# Patient Record
Sex: FEMALE | Race: WHITE | NOT HISPANIC OR LATINO
[De-identification: names, ages, dates, MRNs, and addresses within clinical notes are randomized per-mention and may not be internally consistent; named-entity substitution may affect disease eponyms.]

---

## 2020-08-11 PROBLEM — Z00.00 ENCOUNTER FOR PREVENTIVE HEALTH EXAMINATION: Status: ACTIVE | Noted: 2020-08-11

## 2020-08-12 ENCOUNTER — APPOINTMENT (OUTPATIENT)
Dept: PULMONOLOGY | Facility: CLINIC | Age: 70
End: 2020-08-12
Payer: COMMERCIAL

## 2020-08-12 ENCOUNTER — LABORATORY RESULT (OUTPATIENT)
Age: 70
End: 2020-08-12

## 2020-08-12 VITALS
SYSTOLIC BLOOD PRESSURE: 154 MMHG | TEMPERATURE: 96.9 F | BODY MASS INDEX: 44.48 KG/M2 | OXYGEN SATURATION: 94 % | HEART RATE: 90 BPM | WEIGHT: 267 LBS | DIASTOLIC BLOOD PRESSURE: 92 MMHG | HEIGHT: 65 IN

## 2020-08-12 DIAGNOSIS — Z87.891 PERSONAL HISTORY OF NICOTINE DEPENDENCE: ICD-10-CM

## 2020-08-12 DIAGNOSIS — R07.81 PLEURODYNIA: ICD-10-CM

## 2020-08-12 DIAGNOSIS — Z78.9 OTHER SPECIFIED HEALTH STATUS: ICD-10-CM

## 2020-08-12 DIAGNOSIS — Z82.49 FAMILY HISTORY OF ISCHEMIC HEART DISEASE AND OTHER DISEASES OF THE CIRCULATORY SYSTEM: ICD-10-CM

## 2020-08-12 LAB — EPWORTH SCORE: 2

## 2020-08-12 PROCEDURE — 99205 OFFICE O/P NEW HI 60 MIN: CPT

## 2020-08-12 NOTE — HISTORY OF PRESENT ILLNESS
[Former] : former [Never] : never [TextBox_11] : 1 [TextBox_4] : I was asked to consult on this patient by cardiology for persistent shortness of breath\par The patient is a 71 yo morbidly obese WW former 52-pack-year smoker (1 PPDx 52 years) quit in 2019 and then 2 months later began complaining of increasing shortness of breath. 6 months after she quit smoking she had a stress echo which showed an inferior wall defect and had a cardiac catheterization and one stent placed in the circumflex artery. Despite the successful stent insertion she still complains of severe dyspnea on exertion just walking a few steps even on flat ground but certainly worse up inclines or when she is to wear a mask. She started cardiac rehabilitation 3 weeks ago and hasn't noticed an improvement. She has gained over 70 pounds in the past year. She has a rare cough but denies any phlegm although she has occasional chest rattle that clears after a few deep breaths/ she denies any chest pain or tightness but does have a right anterior parasternal pressure that can occur spontaneously and resolves quickly. She has severe seasonal and perennial allergies but no recent allergy tests. She has severe nasal congestion but denies any postnasal drip or reflux. She does snore loudly and is very tired and has to take naps in the afternoon. She has nocturia twice/night. [TextBox_13] : 52 [YearQuit] : 2019

## 2020-08-12 NOTE — PROCEDURE
[FreeTextEntry1] : 1/17/2020 WBC 5.3 hemoglobin 12.1 platelets 235 eos 3% glucose 114 creatinine 1.2\par Echo 2/4/2020 moderate LAE normal LV function heavy mitral annular calcification\par Exercise perfusion 2/5/2020 moderate size moderate intensity inferior defect normal LV size and function \par 2/12/2020 cardiac catheter one-vessel CAD left Cx\par

## 2020-08-12 NOTE — CONSULT LETTER
[FreeTextEntry1] : Thank you for allowing me to consult on RENETTA SOTO  for SOB/COPD .  Please see my note below.\par \par \par \par   [___] : [unfilled] [FreeTextEntry3] : Thank you very much for allowing me to consult on your patient.  If you have any questions, please do not hesitate to contact me.\par \par Sincerely,\par \par Kevin Parekh MD\par Pulmonary and Sleep Medicine\par [facility]

## 2020-08-12 NOTE — REASON FOR VISIT
[Initial] : an initial visit [Shortness of Breath] : shortness of breath [TextBox_44] : since nov 2019

## 2020-08-12 NOTE — PHYSICAL EXAM
[Low Lying Soft Palate] : low lying soft palate [Erythema] : erythema [III] : Mallampati Class: III [Normal Appearance] : normal appearance [Normal Rate/Rhythm] : normal rate/rhythm [No Neck Mass] : no neck mass [Normal S1, S2] : normal s1, s2 [Murmur ___ / 6] : murmur [unfilled] / 6 [No Murmurs] : no murmurs [No Resp Distress] : no resp distress [Clear to Auscultation Bilaterally] : clear to auscultation bilaterally [Benign] : benign [No Abnormalities] : no abnormalities [Gait - Sufficient For Exercise Testing] : gait sufficient for exercise testing [Normal Gait] : normal gait [No Clubbing] : no clubbing [No Cyanosis] : no cyanosis [No Edema] : no edema [FROM] : FROM [Normal Color/ Pigmentation] : normal color/ pigmentation [Oriented x3] : oriented x3 [No Focal Deficits] : no focal deficits [TextBox_2] : Obese, tachypneic  [Normal Affect] : normal affect [TextBox_54] : RHONDA [TextBox_89] : Obese [TextBox_68] : Diffuse rhonchi and mild-mod wheeze

## 2020-08-12 NOTE — REVIEW OF SYSTEMS
[Dry Mouth] : dry mouth [Nasal Congestion] : nasal congestion [Dyspnea] : dyspnea [A.M. Dry Mouth] : a.m. dry mouth [Wheezing] : wheezing [SOB on Exertion] : sob on exertion [Orthopnea] : orthopnea [Seasonal Allergies] : seasonal allergies [Obesity] : obesity [Arthralgias] : arthralgias [Negative] : Psychiatric

## 2020-08-24 LAB
ALBUMIN SERPL ELPH-MCNC: 4.3 G/DL
ALP BLD-CCNC: 78 U/L
ALT SERPL-CCNC: 22 U/L
ANION GAP SERPL CALC-SCNC: 14 MMOL/L
AST SERPL-CCNC: 25 U/L
BASOPHILS # BLD AUTO: 0.04 K/UL
BASOPHILS NFR BLD AUTO: 0.7 %
BILIRUB SERPL-MCNC: 0.4 MG/DL
BUN SERPL-MCNC: 17 MG/DL
CALCIUM SERPL-MCNC: 9.7 MG/DL
CHLORIDE SERPL-SCNC: 104 MMOL/L
CO2 SERPL-SCNC: 23 MMOL/L
CREAT SERPL-MCNC: 1.22 MG/DL
EOSINOPHIL # BLD AUTO: 0.17 K/UL
EOSINOPHIL NFR BLD AUTO: 3.2 %
GLUCOSE SERPL-MCNC: 142 MG/DL
HCT VFR BLD CALC: 38.7 %
HGB BLD-MCNC: 11.3 G/DL
IMM GRANULOCYTES NFR BLD AUTO: 0.4 %
LYMPHOCYTES # BLD AUTO: 1.18 K/UL
LYMPHOCYTES NFR BLD AUTO: 21.9 %
MAN DIFF?: NORMAL
MCHC RBC-ENTMCNC: 27.9 PG
MCHC RBC-ENTMCNC: 29.2 GM/DL
MCV RBC AUTO: 95.6 FL
MONOCYTES # BLD AUTO: 0.43 K/UL
MONOCYTES NFR BLD AUTO: 8 %
NEUTROPHILS # BLD AUTO: 3.54 K/UL
NEUTROPHILS NFR BLD AUTO: 65.8 %
PLATELET # BLD AUTO: 256 K/UL
POTASSIUM SERPL-SCNC: 5 MMOL/L
PROT SERPL-MCNC: 7.1 G/DL
RBC # BLD: 4.05 M/UL
RBC # FLD: 16.4 %
SODIUM SERPL-SCNC: 140 MMOL/L
WBC # FLD AUTO: 5.38 K/UL

## 2020-08-25 LAB
A ALTERNATA IGE QN: 2.2 KUA/L
A FUMIGATUS IGE QN: 0.18 KUA/L
BERMUDA GRASS IGE QN: <0.1 KUA/L
BOXELDER IGE QN: <0.1 KUA/L
C HERBARUM IGE QN: 0.11 KUA/L
CALIF WALNUT IGE QN: <0.1 KUA/L
CAT DANDER IGE QN: 1.83 KUA/L
CMN PIGWEED IGE QN: <0.1 KUA/L
COMMON RAGWEED IGE QN: <0.1 KUA/L
COTTONWOOD IGE QN: <0.1 KUA/L
D FARINAE IGE QN: <0.1 KUA/L
D PTERONYSS IGE QN: <0.1 KUA/L
DEPRECATED A ALTERNATA IGE RAST QL: 2
DEPRECATED A FUMIGATUS IGE RAST QL: NORMAL
DEPRECATED BERMUDA GRASS IGE RAST QL: 0
DEPRECATED BOXELDER IGE RAST QL: 0
DEPRECATED C HERBARUM IGE RAST QL: NORMAL
DEPRECATED CAT DANDER IGE RAST QL: 2
DEPRECATED COMMON PIGWEED IGE RAST QL: 0
DEPRECATED COMMON RAGWEED IGE RAST QL: 0
DEPRECATED COTTONWOOD IGE RAST QL: 0
DEPRECATED D FARINAE IGE RAST QL: 0
DEPRECATED D PTERONYSS IGE RAST QL: 0
DEPRECATED DOG DANDER IGE RAST QL: NORMAL
DEPRECATED GOOSEFOOT IGE RAST QL: 0
DEPRECATED LONDON PLANE IGE RAST QL: 0
DEPRECATED MUGWORT IGE RAST QL: 0
DEPRECATED P NOTATUM IGE RAST QL: NORMAL
DEPRECATED RED CEDAR IGE RAST QL: 0
DEPRECATED ROACH IGE RAST QL: 0
DEPRECATED SHEEP SORREL IGE RAST QL: 0
DEPRECATED SILVER BIRCH IGE RAST QL: 0
DEPRECATED TIMOTHY IGE RAST QL: 0
DEPRECATED WHITE ASH IGE RAST QL: 0
DEPRECATED WHITE OAK IGE RAST QL: 0
DOG DANDER IGE QN: 0.18 KUA/L
GOOSEFOOT IGE QN: <0.1 KUA/L
LONDON PLANE IGE QN: <0.1 KUA/L
MUGWORT IGE QN: <0.1 KUA/L
MULBERRY (T70) CLASS: 0
MULBERRY (T70) CONC: <0.1 KUA/L
P NOTATUM IGE QN: 0.28 KUA/L
RED CEDAR IGE QN: <0.1 KUA/L
ROACH IGE QN: <0.1 KUA/L
SHEEP SORREL IGE QN: <0.1 KUA/L
SILVER BIRCH IGE QN: <0.1 KUA/L
TIMOTHY IGE QN: <0.1 KUA/L
TOTAL IGE SMQN RAST: 117 KU/L
TREE ALLERG MIX1 IGE QL: 0
WHITE ASH IGE QN: <0.1 KUA/L
WHITE ELM IGE QN: 0
WHITE ELM IGE QN: <0.1 KUA/L
WHITE OAK IGE QN: <0.1 KUA/L

## 2020-08-26 ENCOUNTER — RESULT REVIEW (OUTPATIENT)
Age: 70
End: 2020-08-26

## 2020-08-26 ENCOUNTER — TRANSCRIPTION ENCOUNTER (OUTPATIENT)
Age: 70
End: 2020-08-26

## 2020-09-08 ENCOUNTER — LABORATORY RESULT (OUTPATIENT)
Age: 70
End: 2020-09-08

## 2020-09-08 ENCOUNTER — APPOINTMENT (OUTPATIENT)
Dept: PULMONOLOGY | Facility: CLINIC | Age: 70
End: 2020-09-08
Payer: COMMERCIAL

## 2020-09-08 VITALS
SYSTOLIC BLOOD PRESSURE: 148 MMHG | WEIGHT: 267 LBS | OXYGEN SATURATION: 95 % | DIASTOLIC BLOOD PRESSURE: 70 MMHG | BODY MASS INDEX: 44.48 KG/M2 | HEART RATE: 90 BPM | TEMPERATURE: 97.3 F | HEIGHT: 65 IN

## 2020-09-08 PROCEDURE — 99215 OFFICE O/P EST HI 40 MIN: CPT

## 2020-09-08 NOTE — PHYSICAL EXAM
[Erythema] : erythema [Low Lying Soft Palate] : low lying soft palate [III] : Mallampati Class: III [Normal Appearance] : normal appearance [No Neck Mass] : no neck mass [Normal Rate/Rhythm] : normal rate/rhythm [Murmur ___ / 6] : murmur [unfilled] / 6 [Normal S1, S2] : normal s1, s2 [No Murmurs] : no murmurs [No Resp Distress] : no resp distress [No Abnormalities] : no abnormalities [Benign] : benign [Normal Gait] : normal gait [Gait - Sufficient For Exercise Testing] : gait sufficient for exercise testing [No Clubbing] : no clubbing [No Cyanosis] : no cyanosis [FROM] : FROM [Normal Color/ Pigmentation] : normal color/ pigmentation [No Focal Deficits] : no focal deficits [Oriented x3] : oriented x3 [Normal Affect] : normal affect [TextBox_2] : Obese, tachypneic  [TextBox_54] : RHONDA [TextBox_68] : Diffuse rhonchi and mild-mod wheeze [TextBox_89] : Obese [TextBox_105] : tr-1+

## 2020-09-08 NOTE — REASON FOR VISIT
[Emphysema] : emphysema [Initial] : an initial visit [Shortness of Breath] : shortness of breath [TextBox_44] : since nov 2019

## 2020-09-08 NOTE — PROCEDURE
[FreeTextEntry1] : 1/17/2020 WBC 5.3 hemoglobin 12.1 platelets 235 eos 3% glucose 114 creatinine 1.2\par Echo 2/4/2020 moderate LAE normal LV function heavy mitral annular calcification\par Exercise perfusion 2/5/2020 moderate size moderate intensity inferior defect normal LV size and function \par 2/12/2020 cardiac catheter one-vessel CAD left Cx\par PFT 8/26/2020 FEV1 1.65 (74% predicted) FEV1/FVC 76 TLC 97% RV/% DLCO 72%\par 6 minute walk test slight desaturation from 97-94% to 6 minutes on room air with an increase in HR from 76-->109

## 2020-09-08 NOTE — HISTORY OF PRESENT ILLNESS
[Former] : former [Never] : never [TextBox_4] : The patient reports a cough that "sounds like croup" since starting on steel toe but she doesn't feel any less shortness of breath. She has rare substernal chest pain and had another episode last night. She has a bird but despite avoiding the bird she is responsible for cleaning the cage. She did not go for sleep study because was not approved by insurance [TextBox_11] : 1 [TextBox_13] : 52 [YearQuit] : 2019

## 2020-09-08 NOTE — REVIEW OF SYSTEMS
[Dyspnea] : dyspnea [SOB on Exertion] : sob on exertion [Chest Discomfort] : chest discomfort [Obesity] : obesity [Negative] : Endocrine [Nasal Congestion] : nasal congestion [Dry Mouth] : dry mouth [Wheezing] : wheezing [A.M. Dry Mouth] : a.m. dry mouth [Orthopnea] : orthopnea [Seasonal Allergies] : seasonal allergies [Arthralgias] : arthralgias

## 2020-09-09 LAB
BASOPHILS # BLD AUTO: 0.04 K/UL
BASOPHILS NFR BLD AUTO: 0.6 %
EOSINOPHIL # BLD AUTO: 0.23 K/UL
EOSINOPHIL NFR BLD AUTO: 3.5 %
FERRITIN SERPL-MCNC: 17 NG/ML
HCT VFR BLD CALC: 39.5 %
HGB BLD-MCNC: 12 G/DL
IMM GRANULOCYTES NFR BLD AUTO: 0.2 %
IRON SATN MFR SERPL: 19 %
IRON SERPL-MCNC: 75 UG/DL
LYMPHOCYTES # BLD AUTO: 1.34 K/UL
LYMPHOCYTES NFR BLD AUTO: 20.5 %
MAN DIFF?: NORMAL
MCHC RBC-ENTMCNC: 28.1 PG
MCHC RBC-ENTMCNC: 30.4 GM/DL
MCV RBC AUTO: 92.5 FL
MONOCYTES # BLD AUTO: 0.73 K/UL
MONOCYTES NFR BLD AUTO: 11.2 %
NEUTROPHILS # BLD AUTO: 4.19 K/UL
NEUTROPHILS NFR BLD AUTO: 64 %
PLATELET # BLD AUTO: 218 K/UL
RBC # BLD: 4.27 M/UL
RBC # FLD: 16.4 %
TIBC SERPL-MCNC: 400 UG/DL
UIBC SERPL-MCNC: 325 UG/DL
WBC # FLD AUTO: 6.54 K/UL

## 2020-09-10 LAB
BUDGERIGAR FEATHER (E78) CLASS: 0
BUDGERIGAR FEATHER (E78) CONC: <0.1 KUA/L
CHICKEN FEATHER IGE QN: <0.1 KUA/L
DEPRECATED CHICKEN FEATHER IGE RAST QL: 0
DEPRECATED DUCK FEATHER IGE RAST QL: 0
DEPRECATED GOOSE FEATHER IGE RAST QL: 0
DUCK FEATHER IGE QN: <0.1 KUA/L
GOOSE FEATHER IGE QN: <0.1 KUA/L

## 2020-09-18 ENCOUNTER — RESULT REVIEW (OUTPATIENT)
Age: 70
End: 2020-09-18

## 2020-10-22 ENCOUNTER — APPOINTMENT (OUTPATIENT)
Dept: PULMONOLOGY | Facility: CLINIC | Age: 70
End: 2020-10-22
Payer: COMMERCIAL

## 2020-10-22 VITALS
DIASTOLIC BLOOD PRESSURE: 68 MMHG | HEART RATE: 81 BPM | OXYGEN SATURATION: 96 % | HEIGHT: 65 IN | WEIGHT: 267 LBS | BODY MASS INDEX: 44.48 KG/M2 | SYSTOLIC BLOOD PRESSURE: 156 MMHG | TEMPERATURE: 97.4 F

## 2020-10-22 PROCEDURE — 99214 OFFICE O/P EST MOD 30 MIN: CPT | Mod: 25

## 2020-10-22 PROCEDURE — 99072 ADDL SUPL MATRL&STAF TM PHE: CPT

## 2020-10-27 NOTE — PHYSICAL EXAM
[Low Lying Soft Palate] : low lying soft palate [Erythema] : erythema [III] : Mallampati Class: III [Normal Appearance] : normal appearance [No Neck Mass] : no neck mass [Normal Rate/Rhythm] : normal rate/rhythm [Murmur ___ / 6] : murmur [unfilled] / 6 [Normal S1, S2] : normal s1, s2 [No Murmurs] : no murmurs [No Resp Distress] : no resp distress [No Abnormalities] : no abnormalities [Benign] : benign [Normal Gait] : normal gait [Gait - Sufficient For Exercise Testing] : gait sufficient for exercise testing [No Clubbing] : no clubbing [No Cyanosis] : no cyanosis [FROM] : FROM [Normal Color/ Pigmentation] : normal color/ pigmentation [No Focal Deficits] : no focal deficits [Oriented x3] : oriented x3 [Normal Affect] : normal affect [TextBox_2] : Obese, tachypneic  [TextBox_54] : RHONDA [TextBox_68] : Diffuse rhonchi and mild-mod wheeze [TextBox_89] : Obese [TextBox_105] : tr-1+

## 2020-10-27 NOTE — HISTORY OF PRESENT ILLNESS
[Former] : former [Never] : never [TextBox_4] : The patient had a CT, home sleep test and blood work is now here to discuss the results. She still gets short of breath at times and she has been trying to avoid the birdcage. No interval weight loss.  [TextBox_11] : 1 [TextBox_13] : 52 [YearQuit] : 2019 [Obstructive Sleep Apnea] : obstructive sleep apnea

## 2020-10-27 NOTE — PROCEDURE
[FreeTextEntry1] : 1/17/2020 WBC 5.3 hemoglobin 12.1 platelets 235 eos 3% glucose 114 creatinine 1.2\par Echo 2/4/2020 moderate LAE normal LV function heavy mitral annular calcification\par Exercise perfusion 2/5/2020 moderate size moderate intensity inferior defect normal LV size and function \par 2/12/2020 cardiac catheter one-vessel CAD left Cx\par PFT 8/26/2020 FEV1 1.65 (74% predicted) FEV1/FVC 76 TLC 97% RV/% DLCO 72%\par 6 minute walk test slight desaturation from 97-94% to 6 minutes on room air with an increase in HR from 76-->109 \par HST 9/27/2020 AHI = 10 low saturation 68%\par All images and report reviewed by me in the office \par CT 9/18/2020 + Ao/ cor Ca++ no evidence of hypersensitivity pneumonitis on the CT scan. There were some small peripheral nodules and shotty MED LG

## 2020-10-27 NOTE — REVIEW OF SYSTEMS
[Dyspnea] : dyspnea [Wheezing] : wheezing [SOB on Exertion] : sob on exertion [Menopause] : menopause [Arthralgias] : arthralgias [Back Pain] : back pain [Obesity] : obesity [Negative] : Endocrine [Nasal Congestion] : nasal congestion [Dry Mouth] : dry mouth [A.M. Dry Mouth] : a.m. dry mouth [Chest Discomfort] : chest discomfort [Orthopnea] : orthopnea [Seasonal Allergies] : seasonal allergies [TextBox_30] : chest tightnesssometimes w neckache like a squeeze

## 2020-10-27 NOTE — REASON FOR VISIT
[Sleep Apnea] : sleep apnea [Emphysema] : emphysema [Initial] : an initial visit [Shortness of Breath] : shortness of breath [TextBox_44] : since nov 2019

## 2020-10-30 ENCOUNTER — APPOINTMENT (OUTPATIENT)
Dept: CARDIOLOGY | Facility: CLINIC | Age: 70
End: 2020-10-30
Payer: COMMERCIAL

## 2020-10-30 ENCOUNTER — NON-APPOINTMENT (OUTPATIENT)
Age: 70
End: 2020-10-30

## 2020-10-30 VITALS
HEIGHT: 66 IN | DIASTOLIC BLOOD PRESSURE: 70 MMHG | WEIGHT: 267 LBS | SYSTOLIC BLOOD PRESSURE: 140 MMHG | BODY MASS INDEX: 42.91 KG/M2

## 2020-10-30 VITALS — HEIGHT: 66 IN | WEIGHT: 267 LBS | BODY MASS INDEX: 42.91 KG/M2

## 2020-10-30 PROCEDURE — 99244 OFF/OP CNSLTJ NEW/EST MOD 40: CPT

## 2020-10-30 PROCEDURE — 93000 ELECTROCARDIOGRAM COMPLETE: CPT

## 2020-10-30 PROCEDURE — 99072 ADDL SUPL MATRL&STAF TM PHE: CPT

## 2020-10-30 RX ORDER — RANOLAZINE 500 MG/1
500 TABLET, EXTENDED RELEASE ORAL
Refills: 0 | Status: DISCONTINUED | COMMUNITY
End: 2020-10-30

## 2020-10-30 RX ORDER — DILTIAZEM HYDROCHLORIDE 180 MG/1
180 CAPSULE, EXTENDED RELEASE ORAL
Refills: 0 | Status: DISCONTINUED | COMMUNITY
End: 2020-10-30

## 2020-10-30 NOTE — ASSESSMENT
[FreeTextEntry1] : October 30\par EKG normal sinus rhythm\par \par Exercise nuclear stress test\par 2-D echocardiogram\par Recommendations discontinuation of ranexa as well as diltiazem\par Blood pressure is borderline and controlled\par Start losartan 25 mg\par Check potassium and creatinine at 34 weeks

## 2020-10-30 NOTE — PHYSICAL EXAM
[General Appearance - Well Developed] : well developed [Normal Appearance] : normal appearance [Well Groomed] : well groomed [General Appearance - Well Nourished] : well nourished [No Deformities] : no deformities [General Appearance - In No Acute Distress] : no acute distress [Normal Conjunctiva] : the conjunctiva exhibited no abnormalities [Eyelids - No Xanthelasma] : the eyelids demonstrated no xanthelasmas [Normal Oral Mucosa] : normal oral mucosa [No Oral Pallor] : no oral pallor [No Oral Cyanosis] : no oral cyanosis [Normal Jugular Venous A Waves Present] : normal jugular venous A waves present [Normal Jugular Venous V Waves Present] : normal jugular venous V waves present [No Jugular Venous Torres A Waves] : no jugular venous torres A waves [Heart Rate And Rhythm] : heart rate and rhythm were normal [Heart Sounds] : normal S1 and S2 [Murmurs] : no murmurs present [Respiration, Rhythm And Depth] : normal respiratory rhythm and effort [Exaggerated Use Of Accessory Muscles For Inspiration] : no accessory muscle use [Auscultation Breath Sounds / Voice Sounds] : lungs were clear to auscultation bilaterally [Abdomen Soft] : soft [Abdomen Tenderness] : non-tender [Abdomen Mass (___ Cm)] : no abdominal mass palpated [Abnormal Walk] : normal gait [Gait - Sufficient For Exercise Testing] : the gait was sufficient for exercise testing [Nail Clubbing] : no clubbing of the fingernails [Cyanosis, Localized] : no localized cyanosis [Petechial Hemorrhages (___cm)] : no petechial hemorrhages [Skin Color & Pigmentation] : normal skin color and pigmentation [] : no rash [No Venous Stasis] : no venous stasis [Skin Lesions] : no skin lesions [No Skin Ulcers] : no skin ulcer [No Xanthoma] : no  xanthoma was observed [Oriented To Time, Place, And Person] : oriented to person, place, and time [Affect] : the affect was normal [Mood] : the mood was normal [No Anxiety] : not feeling anxious

## 2020-10-30 NOTE — HISTORY OF PRESENT ILLNESS
[FreeTextEntry1] : Dr. Parekh\par \par 70-year-old female with a past medical history coronary artery disease status post PCI to left circumflex February 2020 who presents with ongoing episodes of shortness of breath on exertion and intermittent episodes of chest discomfort. She states that she is on diltiazem for PVCs and is also on Ranexa. She has a full pulmonary workup and reportedly is normal. She is a former smoker and morbidly obese however she stopped smoking 15 months ago. She denies any edema.\par She denies history of myocardial infarction, stroke, heart failure

## 2020-11-05 ENCOUNTER — RESULT REVIEW (OUTPATIENT)
Age: 70
End: 2020-11-05

## 2020-11-12 ENCOUNTER — RESULT REVIEW (OUTPATIENT)
Age: 70
End: 2020-11-12

## 2020-12-01 ENCOUNTER — APPOINTMENT (OUTPATIENT)
Dept: CARDIOLOGY | Facility: CLINIC | Age: 70
End: 2020-12-01
Payer: COMMERCIAL

## 2020-12-01 PROCEDURE — 99214 OFFICE O/P EST MOD 30 MIN: CPT

## 2020-12-01 PROCEDURE — 99072 ADDL SUPL MATRL&STAF TM PHE: CPT

## 2020-12-01 NOTE — HISTORY OF PRESENT ILLNESS
[FreeTextEntry1] : Dr. Parekh\par \par 70-year-old female with a past medical history coronary artery disease status post PCI to left circumflex February 2020 who presents with ongoing episodes of shortness of breath on exertion and intermittent episodes of chest discomfort. She states that she is on diltiazem for PVCs and is also on Ranexa. She has a full pulmonary workup and reportedly is normal. She is a former smoker and morbidly obese however she stopped smoking 15 months ago. She denies any edema.\par She denies history of myocardial infarction, stroke, heart failure\par \par Since last visit - still has sob.

## 2020-12-01 NOTE — ASSESSMENT
[FreeTextEntry1] : October 30\par EKG normal sinus rhythm\par \par nuclear stress test - 1 MM St depressions, PVCs.  inferior infarct vs likely attenuation. \par 2-D echocardiogram - Nov 2020 - normal EF, MAC, mild AS, moderate LVH 1.3 cm. \par Recommendations discontinuation of ranexa as well as diltiazem\par \par increase losartan 25 mg to 50 mg. \par Check potassium and creatinine at 3- 4 weeks

## 2020-12-02 ENCOUNTER — APPOINTMENT (OUTPATIENT)
Dept: PULMONOLOGY | Facility: CLINIC | Age: 70
End: 2020-12-02
Payer: COMMERCIAL

## 2020-12-02 VITALS
HEART RATE: 100 BPM | SYSTOLIC BLOOD PRESSURE: 166 MMHG | OXYGEN SATURATION: 95 % | HEIGHT: 66 IN | BODY MASS INDEX: 43.39 KG/M2 | DIASTOLIC BLOOD PRESSURE: 64 MMHG | WEIGHT: 270 LBS

## 2020-12-02 DIAGNOSIS — Z23 ENCOUNTER FOR IMMUNIZATION: ICD-10-CM

## 2020-12-02 PROCEDURE — 99215 OFFICE O/P EST HI 40 MIN: CPT | Mod: 25

## 2020-12-02 PROCEDURE — 99072 ADDL SUPL MATRL&STAF TM PHE: CPT

## 2020-12-02 PROCEDURE — G0008: CPT

## 2020-12-02 PROCEDURE — 90662 IIV NO PRSV INCREASED AG IM: CPT

## 2020-12-03 ENCOUNTER — MED ADMIN CHARGE (OUTPATIENT)
Age: 70
End: 2020-12-03

## 2020-12-08 ENCOUNTER — TRANSCRIPTION ENCOUNTER (OUTPATIENT)
Age: 70
End: 2020-12-08

## 2020-12-11 NOTE — PHYSICAL EXAM
[Low Lying Soft Palate] : low lying soft palate [III] : Mallampati Class: III [Normal Appearance] : normal appearance [No Neck Mass] : no neck mass [Normal Rate/Rhythm] : normal rate/rhythm [Normal S1, S2] : normal s1, s2 [No Resp Distress] : no resp distress [No Abnormalities] : no abnormalities [Benign] : benign [Normal Gait] : normal gait [Gait - Sufficient For Exercise Testing] : gait sufficient for exercise testing [No Clubbing] : no clubbing [No Cyanosis] : no cyanosis [FROM] : FROM [Normal Color/ Pigmentation] : normal color/ pigmentation [No Focal Deficits] : no focal deficits [Oriented x3] : oriented x3 [Normal Affect] : normal affect [Nasal congestion] : nasal congestion [TextBox_2] : Obese, tachypneic  [TextBox_11] : Oropharynx mild erythema [TextBox_54] : LUSB murmur 3/6 [TextBox_68] : Diffused breath sounds, no wheeze or rales [TextBox_89] : Obese [TextBox_105] : \par 1/2+ LE edema, chronic venous stasis change, tattoo

## 2020-12-11 NOTE — REASON FOR VISIT
[Emphysema] : emphysema [Initial] : an initial visit [Sleep Apnea] : sleep apnea [Shortness of Breath] : shortness of breath [TextBox_44] : since nov 2019

## 2020-12-11 NOTE — REVIEW OF SYSTEMS
[Chest Tightness] : chest tightness [Dyspnea] : dyspnea [SOB on Exertion] : sob on exertion [Arthralgias] : arthralgias [Negative] : Endocrine [Dry Mouth] : dry mouth [Wheezing] : wheezing [A.M. Dry Mouth] : a.m. dry mouth [Chest Discomfort] : chest discomfort [Orthopnea] : orthopnea [Seasonal Allergies] : seasonal allergies [Menopause] : menopause [Back Pain] : back pain [Obesity] : obesity [TextBox_30] : chest tightnesssometimes w neckache like a squeeze

## 2020-12-11 NOTE — HISTORY OF PRESENT ILLNESS
[Former] : former [Never] : never [Obstructive Sleep Apnea] : obstructive sleep apnea [TextBox_4] : Patient is a 69 y/o WW ho returns for follow up of her shortness of breath and sleep apnea. She states still has shortness of breath/MAURO and has felt no difference with Symbicort although she did not use it for about a week because she had a croupy cough. She received her APAP about a week and a half ago but just began using it last night. She is unsure if she felt a difference after using the APAP last night. She woke up twice last night. She has been using nasal saline occassionally. [TextBox_11] : 1 [TextBox_13] : 52 [YearQuit] : 2019

## 2020-12-11 NOTE — DISCUSSION/SUMMARY
[FreeTextEntry1] : 11/02/2020 - 12/01/2020\par Usage: 1 day \par Min Pressure Setting 5 cmH2O\par Max Pressure Setting 15 cmH2O\par Pressure - cm H2O (95th percentile: 8.3, Maximum: 9.0)\par Leaks - L/min (95th percentile: 2.4, Maximum: 8.4)\par AHI: 2.4

## 2020-12-22 ENCOUNTER — APPOINTMENT (OUTPATIENT)
Dept: CARDIOLOGY | Facility: CLINIC | Age: 70
End: 2020-12-22
Payer: COMMERCIAL

## 2020-12-22 VITALS
BODY MASS INDEX: 44.03 KG/M2 | HEIGHT: 66 IN | HEART RATE: 98 BPM | OXYGEN SATURATION: 96 % | WEIGHT: 274 LBS | DIASTOLIC BLOOD PRESSURE: 60 MMHG | SYSTOLIC BLOOD PRESSURE: 186 MMHG | TEMPERATURE: 97.8 F

## 2020-12-22 PROCEDURE — 99214 OFFICE O/P EST MOD 30 MIN: CPT

## 2020-12-22 PROCEDURE — 99072 ADDL SUPL MATRL&STAF TM PHE: CPT

## 2020-12-22 RX ORDER — LOSARTAN POTASSIUM 100 MG/1
100 TABLET, FILM COATED ORAL DAILY
Qty: 3 | Refills: 1 | Status: ACTIVE | COMMUNITY
Start: 2020-12-01 | End: 1900-01-01

## 2020-12-22 RX ORDER — LOSARTAN POTASSIUM 25 MG/1
25 TABLET, FILM COATED ORAL DAILY
Qty: 1 | Refills: 1 | Status: DISCONTINUED | COMMUNITY
Start: 2020-10-30 | End: 2020-12-22

## 2020-12-22 NOTE — ASSESSMENT
[FreeTextEntry1] : October 30\par EKG normal sinus rhythm\par \par nuclear stress test - 1 MM St depressions, PVCs.  inferior infarct vs likely attenuation. \par 2-D echocardiogram - Nov 2020 - normal EF, MAC, mild AS, moderate LVH 1.3 cm. \par Recommendations discontinuation of ranexa as well as diltiazem\par \par increase losartan to 100 mg. \par \par Discussed risks and benefits of cardiac catheterization.\par Risks include but not limited to bleeding, infection, vascular compromise, stroke, heart attack, kidney failure, death.\par Benefits include revascularization and resolution of symptoms.\par Patient is in agreement with procedure. Will set up at [facility], [place].\par Patient is on aspirin 81 mg.\par \par

## 2020-12-22 NOTE — HISTORY OF PRESENT ILLNESS
[FreeTextEntry1] : Dr. Parekh\par \par 70-year-old female with a past medical history coronary artery disease status post PCI to left circumflex February 2020 who presents with ongoing episodes of shortness of breath on exertion and intermittent episodes of chest discomfort. She states that she is on diltiazem for PVCs and is also on Ranexa. She has a full pulmonary workup and reportedly is normal. She is a former smoker and morbidly obese however she stopped smoking 15 months ago. She denies any edema.\par She denies history of myocardial infarction, stroke, heart failure\par \par Since last visit - still has sob on exertion.

## 2021-01-04 LAB
ANION GAP SERPL CALC-SCNC: 13 MMOL/L
BASOPHILS # BLD AUTO: 0.06 K/UL
BASOPHILS NFR BLD AUTO: 1 %
BUN SERPL-MCNC: 20 MG/DL
CALCIUM SERPL-MCNC: 9.7 MG/DL
CHLORIDE SERPL-SCNC: 105 MMOL/L
CO2 SERPL-SCNC: 23 MMOL/L
CREAT SERPL-MCNC: 1.17 MG/DL
EOSINOPHIL # BLD AUTO: 0.24 K/UL
EOSINOPHIL NFR BLD AUTO: 3.8 %
GLUCOSE SERPL-MCNC: 154 MG/DL
HCT VFR BLD CALC: 40.3 %
HGB BLD-MCNC: 11.9 G/DL
IMM GRANULOCYTES NFR BLD AUTO: 0.3 %
LYMPHOCYTES # BLD AUTO: 1.65 K/UL
LYMPHOCYTES NFR BLD AUTO: 26.3 %
MAN DIFF?: NORMAL
MCHC RBC-ENTMCNC: 27.7 PG
MCHC RBC-ENTMCNC: 29.5 GM/DL
MCV RBC AUTO: 93.7 FL
MONOCYTES # BLD AUTO: 0.58 K/UL
MONOCYTES NFR BLD AUTO: 9.2 %
NEUTROPHILS # BLD AUTO: 3.73 K/UL
NEUTROPHILS NFR BLD AUTO: 59.4 %
PLATELET # BLD AUTO: 219 K/UL
POTASSIUM SERPL-SCNC: 5 MMOL/L
RBC # BLD: 4.3 M/UL
RBC # FLD: 16.7 %
SODIUM SERPL-SCNC: 140 MMOL/L
WBC # FLD AUTO: 6.28 K/UL

## 2021-01-19 ENCOUNTER — APPOINTMENT (OUTPATIENT)
Dept: CARDIOLOGY | Facility: CLINIC | Age: 71
End: 2021-01-19
Payer: COMMERCIAL

## 2021-01-19 VITALS
BODY MASS INDEX: 44.03 KG/M2 | WEIGHT: 274 LBS | HEIGHT: 66 IN | DIASTOLIC BLOOD PRESSURE: 80 MMHG | SYSTOLIC BLOOD PRESSURE: 160 MMHG

## 2021-01-19 DIAGNOSIS — E78.00 PURE HYPERCHOLESTEROLEMIA, UNSPECIFIED: ICD-10-CM

## 2021-01-19 PROCEDURE — 99214 OFFICE O/P EST MOD 30 MIN: CPT

## 2021-01-19 PROCEDURE — 99072 ADDL SUPL MATRL&STAF TM PHE: CPT

## 2021-01-19 RX ORDER — METOPROLOL TARTRATE 50 MG/1
50 TABLET, FILM COATED ORAL
Qty: 6 | Refills: 1 | Status: DISCONTINUED | COMMUNITY
Start: 2020-12-01 | End: 2021-01-19

## 2021-01-19 RX ORDER — DILTIAZEM HYDROCHLORIDE 120 MG/1
120 CAPSULE, EXTENDED RELEASE ORAL
Qty: 30 | Refills: 0 | Status: DISCONTINUED | COMMUNITY
Start: 2021-01-04 | End: 2021-01-19

## 2021-01-19 NOTE — ASSESSMENT
[FreeTextEntry1] : October 30\par EKG normal sinus rhythm\par \par nuclear stress test - 1 MM St depressions, PVCs.  inferior infarct vs likely attenuation. \par 2-D echocardiogram - Nov 2020 - normal EF, MAC, mild AS, moderate LVH 1.3 cm. \par Recommendations discontinuation of ranexa as well as diltiazem\par \par increase losartan to 100 mg. \par \par \par Cath Jan 2021 - co dominant system, 50% om2 stenosis ffr 0.96, patent distal lcx stent,  SEVERELY HYPERTROPHIED LV with large MID CAVITARY OBSTRUCTION due to LCH. 70 mmHg intracavitary gradient ( LV apexc to base on pullback) \par  no arotic stenosis. \par \par Maximize Beta-blocker - avoid dehydration. \par \par start coreg 3.125 mg BID and increase prn\par \par zio patch to assess for arrhythmias\par cardiac MRI\par \par f/u 3 weeks. \par

## 2021-01-19 NOTE — HISTORY OF PRESENT ILLNESS
[FreeTextEntry1] : Dr. Parehk\par \par 70-year-old female with a past medical history coronary artery disease status post PCI to left circumflex February 2020 who presents with ongoing episodes of shortness of breath on exertion and intermittent episodes of chest discomfort. She states that she is on diltiazem for PVCs and is also on Ranexa. She has a full pulmonary workup and reportedly is normal. She is a former smoker and morbidly obese however she stopped smoking 15 months ago. She denies any edema.\par She denies history of myocardial infarction, stroke, heart failure\par \par Since last visit - still has sob on exertion. patent stent has intracavitary hypertrophy of LV. started on cardizem - no improvement.  bp uncontrolled.

## 2021-02-03 ENCOUNTER — APPOINTMENT (OUTPATIENT)
Dept: PULMONOLOGY | Facility: CLINIC | Age: 71
End: 2021-02-03

## 2021-02-03 VITALS
TEMPERATURE: 96.7 F | BODY MASS INDEX: 44.03 KG/M2 | HEART RATE: 82 BPM | OXYGEN SATURATION: 95 % | SYSTOLIC BLOOD PRESSURE: 162 MMHG | DIASTOLIC BLOOD PRESSURE: 74 MMHG | WEIGHT: 274 LBS | HEIGHT: 66 IN

## 2021-02-04 ENCOUNTER — APPOINTMENT (OUTPATIENT)
Dept: PULMONOLOGY | Facility: CLINIC | Age: 71
End: 2021-02-04
Payer: COMMERCIAL

## 2021-02-04 DIAGNOSIS — J43.2 CENTRILOBULAR EMPHYSEMA: ICD-10-CM

## 2021-02-04 DIAGNOSIS — J67.9 HYPERSENSITIVITY PNEUMONITIS DUE TO UNSPECIFIED ORGANIC DUST: ICD-10-CM

## 2021-02-04 DIAGNOSIS — E61.1 IRON DEFICIENCY: ICD-10-CM

## 2021-02-04 DIAGNOSIS — G47.33 OBSTRUCTIVE SLEEP APNEA (ADULT) (PEDIATRIC): ICD-10-CM

## 2021-02-04 DIAGNOSIS — R91.1 SOLITARY PULMONARY NODULE: ICD-10-CM

## 2021-02-04 DIAGNOSIS — I27.81 COR PULMONALE (CHRONIC): ICD-10-CM

## 2021-02-04 DIAGNOSIS — R09.81 NASAL CONGESTION: ICD-10-CM

## 2021-02-04 DIAGNOSIS — J30.89 OTHER ALLERGIC RHINITIS: ICD-10-CM

## 2021-02-04 DIAGNOSIS — E66.01 MORBID (SEVERE) OBESITY DUE TO EXCESS CALORIES: ICD-10-CM

## 2021-02-04 PROCEDURE — 99215 OFFICE O/P EST HI 40 MIN: CPT

## 2021-02-04 PROCEDURE — 99072 ADDL SUPL MATRL&STAF TM PHE: CPT

## 2021-02-04 NOTE — PHYSICAL EXAM
[TextBox_2] : Obese [TextBox_68] : slight diminished breath sounds, no wheeze or rales [TextBox_11] : dry inflamed nasal mucosa, dry erythematous oropharynx, crowded oropharynx [TextBox_89] : Obese [TextBox_105] : 2+ BLE edema\par 1/2+ LE edema, chronic venous stasis change, tattoo

## 2021-02-04 NOTE — ASSESSMENT
[FreeTextEntry1] : above was discussed at length with the patient who has an excellent understanding of the issues.

## 2021-02-04 NOTE — HISTORY OF PRESENT ILLNESS
[TextBox_4] : Patient returns for follow up of her emphysema, SOB and sleep apnea. She says her breathing is terrible. She became short of breath from walking from the waiting room to the bathroom in the hallway. She had a cardiac cath on 1/4/21 and they found a severely hypertrophied LV with large mid-cavitary obstruction due to LVH. She has a scheduled cardiac MRI in 4 days. She has not used the APAP because she says it did not make a difference as she was still waking up during the night at least once and sometimes twice a night. She takes a nap very rarely during the day if she had a bad night of sleep. She still reports xerostomia and b/l ankle swelling. Denies productive cough. She has not been using nasal saline consistently. She also stopped using inhalers because her barking cough returned [TextBox_11] : 1 [TextBox_13] : 52 [YearQuit] : 2019

## 2021-02-08 ENCOUNTER — RESULT REVIEW (OUTPATIENT)
Age: 71
End: 2021-02-08

## 2021-02-08 PROBLEM — I25.10 ASCVD (ARTERIOSCLEROTIC CARDIOVASCULAR DISEASE): Status: ACTIVE | Noted: 2020-08-12

## 2021-02-09 ENCOUNTER — APPOINTMENT (OUTPATIENT)
Dept: CARDIOLOGY | Facility: CLINIC | Age: 71
End: 2021-02-09
Payer: COMMERCIAL

## 2021-02-09 VITALS — BODY MASS INDEX: 44.03 KG/M2 | HEIGHT: 66 IN | WEIGHT: 274 LBS

## 2021-02-09 DIAGNOSIS — I25.10 ATHEROSCLEROTIC HEART DISEASE OF NATIVE CORONARY ARTERY W/OUT ANGINA PECTORIS: ICD-10-CM

## 2021-02-09 PROCEDURE — 99072 ADDL SUPL MATRL&STAF TM PHE: CPT

## 2021-02-09 PROCEDURE — 93248 EXT ECG>7D<15D REV&INTERPJ: CPT

## 2021-02-09 PROCEDURE — 99214 OFFICE O/P EST MOD 30 MIN: CPT

## 2021-02-09 NOTE — ASSESSMENT
[FreeTextEntry1] : October 30\par EKG normal sinus rhythm\par \par nuclear stress test - 1 MM St depressions, PVCs.  inferior infarct vs likely attenuation. \par 2-D echocardiogram - Nov 2020 - normal EF, MAC, mild AS, moderate LVH 1.3 cm. \par Recommendations discontinuation of ranexa as well as diltiazem\par \par increase losartan to 100 mg. \par \par \par Cath Jan 2021 - co dominant system, 50% om2 stenosis ffr 0.96, patent distal lcx stent,  SEVERELY HYPERTROPHIED LV with large MID CAVITARY OBSTRUCTION due to LVH. 70 mmHg intracavitary gradient ( LV apex to base on pullback) \par  no arotic stenosis. \par \par Maximize Beta-blocker - avoid dehydration. \par \par increase coreg 6.25 mg BID to 12.5 mg BID\par \par zio patch to assess for arrhythmias - 29 episodes of SVT longest 18 seconds. \par cardiac MRI - pending results. \par \par Consult with Dr. Loya.\par f/u 4 weeks. \par \par total time 30 min.

## 2021-02-09 NOTE — HISTORY OF PRESENT ILLNESS
[FreeTextEntry1] : Dr. Parekh\par \par 70-year-old female with a past medical history coronary artery disease status post PCI to left circumflex February 2020 who presents with ongoing episodes of shortness of breath on exertion and intermittent episodes of chest discomfort. She states that she is on diltiazem for PVCs and is also on Ranexa. She has a full pulmonary workup and reportedly is normal. She is a former smoker and morbidly obese however she stopped smoking 15 months ago. She denies any edema.\par She denies history of myocardial infarction, stroke, heart failure\par \par  still has sob on exertion. patent stent has intracavitary hypertrophy of LV. started on cardizem - no improvement.  \par \par Since last visit - had MRI - results pending, still has sob on exertion. Coreg increased - bp improved but still elevated.

## 2021-02-09 NOTE — PHYSICAL EXAM
[General Appearance - Well Developed] : well developed [Normal Appearance] : normal appearance [Well Groomed] : well groomed [General Appearance - Well Nourished] : well nourished [No Deformities] : no deformities [General Appearance - In No Acute Distress] : no acute distress [Normal Conjunctiva] : the conjunctiva exhibited no abnormalities [Eyelids - No Xanthelasma] : the eyelids demonstrated no xanthelasmas [Normal Oral Mucosa] : normal oral mucosa [No Oral Pallor] : no oral pallor [No Oral Cyanosis] : no oral cyanosis [Normal Jugular Venous A Waves Present] : normal jugular venous A waves present [Normal Jugular Venous V Waves Present] : normal jugular venous V waves present [No Jugular Venous Torres A Waves] : no jugular venous torres A waves [Respiration, Rhythm And Depth] : normal respiratory rhythm and effort [Auscultation Breath Sounds / Voice Sounds] : lungs were clear to auscultation bilaterally [Exaggerated Use Of Accessory Muscles For Inspiration] : no accessory muscle use [Heart Rate And Rhythm] : heart rate and rhythm were normal [Heart Sounds] : normal S1 and S2 [Murmurs] : no murmurs present [Abdomen Soft] : soft [Abdomen Tenderness] : non-tender [Abdomen Mass (___ Cm)] : no abdominal mass palpated [Abnormal Walk] : normal gait [Gait - Sufficient For Exercise Testing] : the gait was sufficient for exercise testing [Nail Clubbing] : no clubbing of the fingernails [Cyanosis, Localized] : no localized cyanosis [Petechial Hemorrhages (___cm)] : no petechial hemorrhages [Skin Color & Pigmentation] : normal skin color and pigmentation [] : no rash [No Venous Stasis] : no venous stasis [Skin Lesions] : no skin lesions [No Skin Ulcers] : no skin ulcer [No Xanthoma] : no  xanthoma was observed [Oriented To Time, Place, And Person] : oriented to person, place, and time [Affect] : the affect was normal [Mood] : the mood was normal [No Anxiety] : not feeling anxious [FreeTextEntry1] : Systolic murmur.

## 2021-02-19 ENCOUNTER — APPOINTMENT (OUTPATIENT)
Dept: CARDIOTHORACIC SURGERY | Facility: CLINIC | Age: 71
End: 2021-02-19
Payer: COMMERCIAL

## 2021-02-19 VITALS
OXYGEN SATURATION: 98 % | DIASTOLIC BLOOD PRESSURE: 76 MMHG | BODY MASS INDEX: 44.2 KG/M2 | HEART RATE: 82 BPM | HEIGHT: 66 IN | SYSTOLIC BLOOD PRESSURE: 148 MMHG | TEMPERATURE: 96.8 F | WEIGHT: 275 LBS

## 2021-02-19 DIAGNOSIS — I51.7 CARDIOMEGALY: ICD-10-CM

## 2021-02-19 DIAGNOSIS — I50.33 ACUTE ON CHRONIC DIASTOLIC (CONGESTIVE) HEART FAILURE: ICD-10-CM

## 2021-02-19 PROCEDURE — 99072 ADDL SUPL MATRL&STAF TM PHE: CPT

## 2021-02-19 PROCEDURE — 99204 OFFICE O/P NEW MOD 45 MIN: CPT

## 2021-02-23 PROBLEM — I50.33 ACUTE ON CHRONIC DIASTOLIC CONGESTIVE HEART FAILURE: Status: ACTIVE | Noted: 2021-02-04

## 2021-02-23 PROBLEM — I51.7 LVH (LEFT VENTRICULAR HYPERTROPHY): Status: ACTIVE | Noted: 2021-01-19

## 2021-02-23 RX ORDER — CLOPIDOGREL BISULFATE 75 MG/1
75 TABLET, FILM COATED ORAL DAILY
Qty: 30 | Refills: 1 | Status: ACTIVE | COMMUNITY

## 2021-02-23 RX ORDER — ATORVASTATIN CALCIUM 10 MG/1
10 TABLET, FILM COATED ORAL DAILY
Qty: 90 | Refills: 1 | Status: ACTIVE | COMMUNITY

## 2021-02-24 ENCOUNTER — NON-APPOINTMENT (OUTPATIENT)
Age: 71
End: 2021-02-24

## 2021-02-24 RX ORDER — VERAPAMIL HYDROCHLORIDE 180 MG/1
180 CAPSULE, DELAYED RELEASE PELLETS ORAL DAILY
Qty: 30 | Refills: 1 | Status: ACTIVE | COMMUNITY
Start: 2021-02-24 | End: 1900-01-01

## 2021-02-24 NOTE — HISTORY OF PRESENT ILLNESS
[FreeTextEntry1] : \par 70 year female who was a former smoker (quit in August 2019) with a history of HTN, hyperlipidemia, STEPHON (non-compliant with CPAP), known CAD s/p PCI/GRECIA to LCx in February 2020 and chronic diastolic heart failure with severe LVH with mid cavitary obstruction who has been referred for further evaluation of her structural heart disease.\par \par The patient reports feeling well until about 16 months ago when she began to experience new shortness of breath with exertion that has gotten significantly worse since then. At the time, a stress test showed ischemia and she underwent a PCI. However, her symptoms did not improve at all. Currently, she becomes SOB with very minimal exertion (walking less than 10 feet) as well as with talking. The patient also reports occasional wheezing when SOB but denies a cough. She has significant LE edema and reports a 75 pound weight gain. The patient denies chest pain, orthopnea, PND, dizziness, syncope and palpitations. She does have a CPAP at home but does not use it as she feels it isn't helping. \par \par The patient has undergone an extensive pulmonary and cardiac evaluation. From a pulmonary standpoint, the patient did not desaturate below 94% with the 6 minute walk test. PFTs showed a mild defect and inhalers were discontinued as they were not helping and causing upper airway irritation. A recent cardiac catheterization showed non-obstructive CAD and a severely hypertrophied left ventricle with large mid cavitary obstruction due to LVH. A subsequent cardiac MRI showed asymmetric septal hypertrophy with a hyperdynamic LV EF. \par \par The patient lives at home with . She remains independent in her ADLs. He works as an anesthesiologist at [employer]. Her son is an anesthesiologist at [employer].

## 2021-02-24 NOTE — REVIEW OF SYSTEMS
[Feeling Fatigued] : feeling fatigued [Shortness Of Breath] : shortness of breath [Dyspnea on exertion] : dyspnea during exertion [Lower Ext Edema] : lower extremity edema [Wheezing] : wheezing [Negative] : Psychiatric [Fever] : no fever [Headache] : no headache [Chills] : no chills [Chest  Pressure] : no chest pressure [Chest Pain] : no chest pain [Leg Claudication] : no intermittent leg claudication [Palpitations] : no palpitations [Cough] : no cough [Coughing Up Blood] : no hemoptysis

## 2021-02-24 NOTE — PHYSICAL EXAM
[Normal Appearance] : normal appearance [General Appearance - In No Acute Distress] : no acute distress [Normal Conjunctiva] : the conjunctiva exhibited no abnormalities [Normal Oral Mucosa] : normal oral mucosa [Normal Jugular Venous V Waves Present] : normal jugular venous V waves present [Heart Rate And Rhythm] : heart rate and rhythm were normal [Heart Sounds] : normal S1 and S2 [Murmurs] : no murmurs present [Exaggerated Use Of Accessory Muscles For Inspiration] : no accessory muscle use [Auscultation Breath Sounds / Voice Sounds] : lungs were clear to auscultation bilaterally [Bowel Sounds] : normal bowel sounds [Abdomen Soft] : soft [Abdomen Tenderness] : non-tender [Abnormal Walk] : normal gait [Nail Clubbing] : no clubbing of the fingernails [Cyanosis, Localized] : no localized cyanosis [Skin Color & Pigmentation] : normal skin color and pigmentation [Oriented To Time, Place, And Person] : oriented to person, place, and time [FreeTextEntry1] : Appears short of breath with speaking.

## 2021-03-08 ENCOUNTER — NON-APPOINTMENT (OUTPATIENT)
Age: 71
End: 2021-03-08

## 2021-03-18 ENCOUNTER — APPOINTMENT (OUTPATIENT)
Dept: PULMONOLOGY | Facility: CLINIC | Age: 71
End: 2021-03-18

## 2021-03-25 ENCOUNTER — APPOINTMENT (OUTPATIENT)
Dept: CARDIOLOGY | Facility: CLINIC | Age: 71
End: 2021-03-25

## 2021-08-04 ENCOUNTER — RX RENEWAL (OUTPATIENT)
Age: 71
End: 2021-08-04